# Patient Record
Sex: MALE | Race: WHITE | NOT HISPANIC OR LATINO | URBAN - METROPOLITAN AREA
[De-identification: names, ages, dates, MRNs, and addresses within clinical notes are randomized per-mention and may not be internally consistent; named-entity substitution may affect disease eponyms.]

---

## 2023-04-12 ENCOUNTER — OFFICE VISIT (OUTPATIENT)
Dept: URBAN - METROPOLITAN AREA CLINIC 113 | Facility: CLINIC | Age: 23
End: 2023-04-12
Payer: COMMERCIAL

## 2023-04-12 ENCOUNTER — LAB OUTSIDE AN ENCOUNTER (OUTPATIENT)
Dept: URBAN - METROPOLITAN AREA CLINIC 113 | Facility: CLINIC | Age: 23
End: 2023-04-12

## 2023-04-12 VITALS
BODY MASS INDEX: 24.37 KG/M2 | WEIGHT: 160.8 LBS | HEIGHT: 68 IN | SYSTOLIC BLOOD PRESSURE: 120 MMHG | RESPIRATION RATE: 18 BRPM | HEART RATE: 82 BPM | DIASTOLIC BLOOD PRESSURE: 63 MMHG | TEMPERATURE: 97.5 F

## 2023-04-12 DIAGNOSIS — R19.7 DIARRHEA, UNSPECIFIED TYPE: ICD-10-CM

## 2023-04-12 DIAGNOSIS — R93.5 ABNORMAL CT OF THE ABDOMEN: ICD-10-CM

## 2023-04-12 DIAGNOSIS — R10.30 LOWER ABDOMINAL PAIN: ICD-10-CM

## 2023-04-12 PROCEDURE — 99204 OFFICE O/P NEW MOD 45 MIN: CPT | Performed by: INTERNAL MEDICINE

## 2023-04-12 RX ORDER — VALACYCLOVIR 1 G/1
1 TABLET TABLET, FILM COATED ORAL ONCE A DAY
Status: ACTIVE | COMMUNITY

## 2023-04-17 ENCOUNTER — CLAIMS CREATED FROM THE CLAIM WINDOW (OUTPATIENT)
Dept: URBAN - METROPOLITAN AREA CLINIC 4 | Facility: CLINIC | Age: 23
End: 2023-04-17
Payer: COMMERCIAL

## 2023-04-17 ENCOUNTER — OFFICE VISIT (OUTPATIENT)
Dept: URBAN - METROPOLITAN AREA SURGERY CENTER 25 | Facility: SURGERY CENTER | Age: 23
End: 2023-04-17
Payer: COMMERCIAL

## 2023-04-17 ENCOUNTER — WEB ENCOUNTER (OUTPATIENT)
Dept: URBAN - METROPOLITAN AREA SURGERY CENTER 25 | Facility: SURGERY CENTER | Age: 23
End: 2023-04-17

## 2023-04-17 DIAGNOSIS — R93.3 ABN FINDINGS-GI TRACT: ICD-10-CM

## 2023-04-17 DIAGNOSIS — K52.3 COLITIS, INDETERMINATE: ICD-10-CM

## 2023-04-17 DIAGNOSIS — K52.89 OTHER SPECIFIED NONINFECTIVE GASTROENTERITIS AND COLITIS: ICD-10-CM

## 2023-04-17 PROCEDURE — G8907 PT DOC NO EVENTS ON DISCHARG: HCPCS | Performed by: INTERNAL MEDICINE

## 2023-04-17 PROCEDURE — 88305 TISSUE EXAM BY PATHOLOGIST: CPT | Performed by: PATHOLOGY

## 2023-04-17 PROCEDURE — 45380 COLONOSCOPY AND BIOPSY: CPT | Performed by: INTERNAL MEDICINE

## 2023-04-17 PROCEDURE — 88342 IMHCHEM/IMCYTCHM 1ST ANTB: CPT | Performed by: PATHOLOGY

## 2023-04-17 RX ORDER — VALACYCLOVIR 1 G/1
1 TABLET TABLET, FILM COATED ORAL ONCE A DAY
Status: ACTIVE | COMMUNITY

## 2023-04-17 RX ORDER — PREDNISONE 10 MG/1
4 TABLETS TABLET ORAL ONCE A DAY
Qty: 120 TABLET | Refills: 1 | OUTPATIENT
Start: 2023-04-26 | End: 2023-06-25

## 2023-04-26 PROBLEM — 56689002: Status: ACTIVE | Noted: 2023-04-26

## 2023-04-27 ENCOUNTER — TELEPHONE ENCOUNTER (OUTPATIENT)
Dept: URBAN - METROPOLITAN AREA CLINIC 113 | Facility: CLINIC | Age: 23
End: 2023-04-27

## 2023-05-03 ENCOUNTER — TELEPHONE ENCOUNTER (OUTPATIENT)
Dept: URBAN - METROPOLITAN AREA CLINIC 113 | Facility: CLINIC | Age: 23
End: 2023-05-03

## 2023-05-18 ENCOUNTER — TELEPHONE ENCOUNTER (OUTPATIENT)
Dept: URBAN - METROPOLITAN AREA CLINIC 113 | Facility: CLINIC | Age: 23
End: 2023-05-18

## 2023-07-18 ENCOUNTER — OFFICE VISIT (OUTPATIENT)
Dept: URBAN - METROPOLITAN AREA CLINIC 113 | Facility: CLINIC | Age: 23
End: 2023-07-18
Payer: COMMERCIAL

## 2023-07-18 ENCOUNTER — WEB ENCOUNTER (OUTPATIENT)
Dept: URBAN - METROPOLITAN AREA CLINIC 113 | Facility: CLINIC | Age: 23
End: 2023-07-18

## 2023-07-18 VITALS
WEIGHT: 159 LBS | TEMPERATURE: 97.8 F | HEIGHT: 68 IN | BODY MASS INDEX: 24.1 KG/M2 | HEART RATE: 77 BPM | DIASTOLIC BLOOD PRESSURE: 72 MMHG | SYSTOLIC BLOOD PRESSURE: 112 MMHG

## 2023-07-18 DIAGNOSIS — K50.00 CROHN'S DISEASE OF SMALL INTESTINE WITHOUT COMPLICATIONS: ICD-10-CM

## 2023-07-18 PROCEDURE — 99214 OFFICE O/P EST MOD 30 MIN: CPT | Performed by: INTERNAL MEDICINE

## 2023-07-18 RX ORDER — VALACYCLOVIR 1 G/1
1 TABLET TABLET, FILM COATED ORAL ONCE A DAY
Status: ACTIVE | COMMUNITY

## 2023-07-18 NOTE — HPI-TODAY'S VISIT:
Noe Roberts (Cole) is a 22-year-old male initially referred by Dr. Ahumada for evaluation of possible Crohn's disease.  He was initially seen as a new patient on 4/12/23.  The patient has had symptoms of crampy lower abdominal pain, loose stools and bloating for a year and a half or so.  Please demonstrated a complete inability to gain weight despite attempts to do so.  He typically averages 3-4 bowel movements per day.  On March 30, 2023, he had the acute onset of severe abdominal pain with nausea and vomiting and ultimately presented to the [facility] emergency room.  There he had a CBC, complete metabolic panel and other labs done as well as a CT scan of the abdomen and pelvis.  The patient's CBC showed a white blood cell count of 10,200, hemoglobin was 16.3, hematocrit was 50, platelet count was 340,000, AST was 35, ALT 26, total bilirubin 1.2, alkaline phosphatase 93, hepatitis C antibody was negative, C-reactive protein was 2.02, and sedimentation rate was 11.  The patient had a CT scan done on that time which showed inflammation of the small bowel and colon consistent with Crohn's disease.  He has done relatively well since that time, although he still has intermittent blood in the stool, bloating, and loose stools in general.  He has never had an upper endoscopy or colonoscopy.  After his initial evalustion on 4/12/23, he underwent a colonoscopy on 4/17/23 which showed active inflammatory  bowel disease in the terminal ileum and the cecum. Remicade was ordered but was denied by insurance.  The patient is doing well currently.  He has had no recurrence of symptoms overall, although he has noted that if he eats certain foods such as broccoli or cauliflower that he will develop nausea and lower abdominal discomfort.  He does feel that he is tender in the right lower quadrant area.  Interestingly, the patient's Remicade was ultimately approved after appeal.  He denies any diarrhea, fever, chills, or sweats.

## 2023-07-24 ENCOUNTER — TELEPHONE ENCOUNTER (OUTPATIENT)
Dept: URBAN - METROPOLITAN AREA CLINIC 113 | Facility: CLINIC | Age: 23
End: 2023-07-24

## 2023-08-02 ENCOUNTER — TELEPHONE ENCOUNTER (OUTPATIENT)
Dept: URBAN - METROPOLITAN AREA CLINIC 113 | Facility: CLINIC | Age: 23
End: 2023-08-02

## 2023-08-02 RX ORDER — PREDNISONE 10 MG/1
2 TABLET TABLET ORAL ONCE A DAY
Qty: 60 TABLET | Refills: 1 | OUTPATIENT
Start: 2023-08-02 | End: 2023-10-01

## 2023-08-03 ENCOUNTER — OFFICE VISIT (OUTPATIENT)
Dept: URBAN - METROPOLITAN AREA CLINIC 113 | Facility: CLINIC | Age: 23
End: 2023-08-03
Payer: COMMERCIAL

## 2023-08-03 VITALS
DIASTOLIC BLOOD PRESSURE: 65 MMHG | WEIGHT: 159 LBS | HEIGHT: 68 IN | SYSTOLIC BLOOD PRESSURE: 117 MMHG | HEART RATE: 68 BPM | TEMPERATURE: 97.7 F | RESPIRATION RATE: 16 BRPM | BODY MASS INDEX: 24.1 KG/M2

## 2023-08-03 DIAGNOSIS — R10.30 LOWER ABDOMINAL PAIN: ICD-10-CM

## 2023-08-03 DIAGNOSIS — K50.00 CROHN'S DISEASE OF SMALL INTESTINE WITHOUT COMPLICATIONS: ICD-10-CM

## 2023-08-03 PROCEDURE — 99214 OFFICE O/P EST MOD 30 MIN: CPT | Performed by: INTERNAL MEDICINE

## 2023-08-03 RX ORDER — PREDNISONE 10 MG/1
2 TABLET TABLET ORAL ONCE A DAY
Qty: 60 TABLET | Refills: 1 | Status: ACTIVE | COMMUNITY
Start: 2023-08-02 | End: 2023-10-01

## 2023-08-03 RX ORDER — VALACYCLOVIR 1 G/1
1 TABLET TABLET, FILM COATED ORAL ONCE A DAY
Status: ACTIVE | COMMUNITY

## 2023-08-03 NOTE — HPI-TODAY'S VISIT:
Noe Roberts (Cole) is a 22-year-old male initially referred by Dr. Ahumada for evaluation of possible Crohn's disease.  He was initially seen as a new patient on 4/12/23.  The patient has had symptoms of crampy lower abdominal pain, loose stools and bloating for a year and a half or so.  Please demonstrated a complete inability to gain weight despite attempts to do so.  He typically averages 3-4 bowel movements per day.  On March 30, 2023, he had the acute onset of severe abdominal pain with nausea and vomiting and ultimately presented to the [facility] emergency room.  There he had a CBC, complete metabolic panel and other labs done as well as a CT scan of the abdomen and pelvis.  The patient's CBC showed a white blood cell count of 10,200, hemoglobin was 16.3, hematocrit was 50, platelet count was 340,000, AST was 35, ALT 26, total bilirubin 1.2, alkaline phosphatase 93, hepatitis C antibody was negative, C-reactive protein was 2.02, and sedimentation rate was 11.  The patient had a CT scan done on that time which showed inflammation of the small bowel and colon consistent with Crohn's disease.  He has done relatively well since that time, although he still has intermittent blood in the stool, bloating, and loose stools in general.  He has never had an upper endoscopy or colonoscopy.  After his initial evalustion on 4/12/23, he underwent a colonoscopy on 4/17/23 which showed active inflammatory  bowel disease in the terminal ileum and the cecum. Remicade was ordered but was denied by insurance.  The patient was doing well at the time of his 7/18/23 follow up visit.   He had had no recurrence of symptoms overall, although he has noted that if he eats certain foods such as broccoli or cauliflower that he will develop nausea and lower abdominal discomfort.  He was tender in the right lower quadrant area.  Interestingly, the patient's Remicade was ultimately approved after appeal.  He denied any diarrhea, fever, chills, or sweats.  Patient had his initial induction dose of Remicade last week, and began having crampy lower abdominal pain 3 days later.  He also had nausea but no vomiting.  He denied any fever.  I obtained an abdominal series on him and it revealed a partial small bowel obstruction.  When I called him, he was not actively vomiting, so I called in prednisone 20 mg daily which she began taking last night.  He already feels better.  He has had decreased abdominal pain, no fever, and has not vomited.

## 2023-08-03 NOTE — EXAM-FUNCTIONAL ASSESSMENT
General--no acute distress Eyes--anicteric HENT--normocephalic, atraumatic head Neck--no lymphadenopathy Chest--normal breath sounds Heart--regular rate and rhythm Abdomen--soft, RLQ tender with some fullness in this area, non distended, bowel sounds present Musculoskeletal--normal gait and station Skin--no rashes Neurologic--Alert and oriented x 3 Psychiatric--stable mood, appropriate affect

## 2023-09-14 ENCOUNTER — OFFICE VISIT (OUTPATIENT)
Dept: URBAN - METROPOLITAN AREA CLINIC 113 | Facility: CLINIC | Age: 23
End: 2023-09-14
Payer: COMMERCIAL

## 2023-09-14 VITALS
HEART RATE: 68 BPM | TEMPERATURE: 97.3 F | DIASTOLIC BLOOD PRESSURE: 63 MMHG | WEIGHT: 160.4 LBS | HEIGHT: 68 IN | SYSTOLIC BLOOD PRESSURE: 128 MMHG | RESPIRATION RATE: 18 BRPM | BODY MASS INDEX: 24.31 KG/M2

## 2023-09-14 DIAGNOSIS — R10.30 LOWER ABDOMINAL PAIN: ICD-10-CM

## 2023-09-14 DIAGNOSIS — K50.00 CROHN'S DISEASE OF SMALL INTESTINE WITHOUT COMPLICATIONS: ICD-10-CM

## 2023-09-14 PROCEDURE — 99214 OFFICE O/P EST MOD 30 MIN: CPT | Performed by: INTERNAL MEDICINE

## 2023-09-14 RX ORDER — ESCITALOPRAM OXALATE 10 MG/1
1 TABLET TABLET, FILM COATED ORAL ONCE A DAY
Status: ACTIVE | COMMUNITY

## 2023-09-14 RX ORDER — VALACYCLOVIR 1 G/1
1 TABLET TABLET, FILM COATED ORAL ONCE A DAY
Status: ACTIVE | COMMUNITY

## 2023-09-14 RX ORDER — PREDNISONE 10 MG/1
2 TABLET TABLET ORAL ONCE A DAY
Qty: 60 TABLET | Refills: 1 | Status: ON HOLD | COMMUNITY
Start: 2023-08-02 | End: 2023-10-01

## 2023-09-14 NOTE — HPI-TODAY'S VISIT:
Noe Roberts (Cole) is a 23-year-old male initially referred by Dr. Ahumada for evaluation of possible Crohn's disease.  He was initially seen as a new patient on 4/12/23.  The patient has had symptoms of crampy lower abdominal pain, loose stools and bloating for a year and a half or so.  Please demonstrated a complete inability to gain weight despite attempts to do so.  He typically averages 3-4 bowel movements per day.  On March 30, 2023, he had the acute onset of severe abdominal pain with nausea and vomiting and ultimately presented to the [facility] emergency room.  There he had a CBC, complete metabolic panel and other labs done as well as a CT scan of the abdomen and pelvis.  The patient's CBC showed a white blood cell count of 10,200, hemoglobin was 16.3, hematocrit was 50, platelet count was 340,000, AST was 35, ALT 26, total bilirubin 1.2, alkaline phosphatase 93, hepatitis C antibody was negative, C-reactive protein was 2.02, and sedimentation rate was 11.  The patient had a CT scan done on that time which showed inflammation of the small bowel and colon consistent with Crohn's disease.  He has done relatively well since that time, although he still has intermittent blood in the stool, bloating, and loose stools in general.  He has never had an upper endoscopy or colonoscopy.  After his initial evalustion on 4/12/23, he underwent a colonoscopy on 4/17/23 which showed active inflammatory  bowel disease in the terminal ileum and the cecum. Remicade was ordered but was denied by insurance.  The patient was doing well at the time of his 7/18/23 follow up visit.   He had had no recurrence of symptoms overall, although he has noted that if he eats certain foods such as broccoli or cauliflower that he will develop nausea and lower abdominal discomfort.  He was tender in the right lower quadrant area.  Interestingly, the patient's Remicade was ultimately approved after appeal.  He denied any diarrhea, fever, chills, or sweats.  Patient had his initial induction dose of Remicade the week prior to his 8/3/23 office visit, and began having crampy lower abdominal pain 3 days later.  He also had nausea but no vomiting.  He denied any fever.  I obtained an abdominal series on him and it revealed a partial small bowel obstruction.  When I called him, he was not actively vomiting, so I called in prednisone 20 mg daily which he began taking the night prior to his 8/3/23 OV.  He responded almost immediately, withdecreased abdominal pain, no fever, and no further vomiting.  The patient returns for follow-up.  He is off prednisone.  He has very active bowel sounds after eating, and otherwise does not have any complaints.  He still has some mild right lower quadrant tenderness.  He had a rash on his back after his last Remicade infusion which resolved after about 4 days.  Otherwise, he has no complaints today.

## 2023-12-20 ENCOUNTER — OFFICE VISIT (OUTPATIENT)
Dept: URBAN - METROPOLITAN AREA CLINIC 113 | Facility: CLINIC | Age: 23
End: 2023-12-20

## 2023-12-20 RX ORDER — ESCITALOPRAM OXALATE 10 MG/1
1 TABLET TABLET, FILM COATED ORAL ONCE A DAY
Status: ACTIVE | COMMUNITY

## 2023-12-20 RX ORDER — VALACYCLOVIR 1 G/1
1 TABLET TABLET, FILM COATED ORAL ONCE A DAY
Status: ACTIVE | COMMUNITY

## 2023-12-20 NOTE — HPI-TODAY'S VISIT:
Noe Roberts (Cole) is a 23-year-old male initially referred by Dr. Ahumada for evaluation of possible Crohn's disease.  He was initially seen as a new patient on 4/12/23; his last office visit was on 9/14/23.  The patient described symptoms of crampy lower abdominal pain, loose stools and bloating dating back to early 2022 and noted a complete inability to gain weight despite attempts to do so.  He typically averages 3-4 bowel movements per day.    On March 30, 2023, he had the acute onset of severe abdominal pain with nausea and vomiting and ultimately presented to the [facility] emergency room.  There he had a CBC, complete metabolic panel and other labs done as well as a CT scan of the abdomen and pelvis.  The patient's CBC showed a white blood cell count of 10,200, [id number], hematocrit 50, platelet count 340,000, AST 35, ALT 26, total bilirubin 1.2, alkaline phosphatase 93, hepatitis C antibody was negative, C-reactive protein  2.02, and sedimentation rate 11.  The patient had a CT scan done on that time which showed inflammation of the small bowel and colon consistent with Crohn's disease.  He has done relatively well since that time, although he still has intermittent blood in the stool, bloating, and loose stools in general.  He has never had an upper endoscopy or colonoscopy.  After his initial evalustion on 4/12/23, he underwent a colonoscopy on 4/17/23 which showed active inflammatory  bowel disease in the terminal ileum and the cecum. Remicade was ordered but was denied by insurance.  The patient was doing well at the time of his 7/18/23 follow up visit.   He had had no recurrence of symptoms overall, although he had noted that if he ate certain foods such as broccoli or cauliflower that he will develop nausea and lower abdominal discomfort.  He was tender in the right lower quadrant area.  Interestingly, the patient's Remicade was ultimately approved after appeal.  He denied any diarrhea, fever, chills, or sweats.  Patient had his initial induction dose of Remicade the week prior to his 8/3/23 office visit, and began having crampy lower abdominal pain 3 days later.  He also had nausea but no vomiting.  He denied any fever.  I obtained an abdominal series on him and it revealed a partial small bowel obstruction.  When I called him, he was not actively vomiting, so I called in prednisone 20 mg daily which he began taking the night prior to his 8/3/23 OV.  He responded almost immediately, withdecreased abdominal pain, no fever, and no further vomiting.  The patient returned for follow-up on 9/14/23.  He was off prednisone.  He had very active bowel sounds after eating, but otherwise did not have any complaints.  He still had some mild right lower quadrant tenderness.  He had a rash on his back after his last Remicade infusion which resolved after about 4 days.

## 2024-02-28 ENCOUNTER — OV EP (OUTPATIENT)
Dept: URBAN - METROPOLITAN AREA CLINIC 113 | Facility: CLINIC | Age: 24
End: 2024-02-28

## 2024-02-28 ENCOUNTER — LAB (OUTPATIENT)
Dept: URBAN - METROPOLITAN AREA CLINIC 113 | Facility: CLINIC | Age: 24
End: 2024-02-28

## 2024-02-28 VITALS
HEART RATE: 75 BPM | BODY MASS INDEX: 26.31 KG/M2 | HEIGHT: 68 IN | DIASTOLIC BLOOD PRESSURE: 62 MMHG | WEIGHT: 173.6 LBS | RESPIRATION RATE: 20 BRPM | TEMPERATURE: 97.5 F | SYSTOLIC BLOOD PRESSURE: 106 MMHG

## 2024-02-28 RX ORDER — ESCITALOPRAM OXALATE 10 MG/1
1 TABLET TABLET, FILM COATED ORAL ONCE A DAY
Status: ON HOLD | COMMUNITY

## 2024-02-28 RX ORDER — VALACYCLOVIR 1 G/1
1 TABLET TABLET, FILM COATED ORAL ONCE A DAY
Status: ACTIVE | COMMUNITY

## 2024-02-28 NOTE — HPI-TODAY'S VISIT:
Noe Roberts (Cole) is a 23-year-old male initially referred by Dr. Ahumada for evaluation of possible Crohn's disease.  He was initially seen as a new patient on 4/12/23; his last office visit was on 9/14/23.  The patient described symptoms of crampy lower abdominal pain, loose stools and bloating dating back to early 2022 and noted a complete inability to gain weight despite attempts to do so.  He typically averages 3-4 bowel movements per day.    On March 30, 2023, he had the acute onset of severe abdominal pain with nausea and vomiting and ultimately presented to the [facility] emergency room.  There he had a CBC, complete metabolic panel and other labs done as well as a CT scan of the abdomen and pelvis.  The patient's CBC showed a white blood cell count of 10,200, [id number], hematocrit 50, platelet count 340,000, AST 35, ALT 26, total bilirubin 1.2, alkaline phosphatase 93, hepatitis C antibody was negative, C-reactive protein  2.02, and sedimentation rate 11.  The patient had a CT scan done on that time which showed inflammation of the small bowel and colon consistent with Crohn's disease.  He has done relatively well since that time, although he still has intermittent blood in the stool, bloating, and loose stools in general.  He has never had an upper endoscopy or colonoscopy.  After his initial evalustion on 4/12/23, he underwent a colonoscopy on 4/17/23 which showed active inflammatory  bowel disease in the terminal ileum and the cecum. Remicade was ordered but was denied by insurance.  The patient was doing well at the time of his 7/18/23 follow up visit.   He had had no recurrence of symptoms overall, although he had noted that if he ate certain foods such as broccoli or cauliflower that he will develop nausea and lower abdominal discomfort.  He was tender in the right lower quadrant area.  Interestingly, the patient's Remicade was ultimately approved after appeal.  He denied any diarrhea, fever, chills, or sweats.  Patient had his initial induction dose of Remicade the week prior to his 8/3/23 office visit, and began having crampy lower abdominal pain 3 days later.  He also had nausea but no vomiting.  He denied any fever.  I obtained an abdominal series on him and it revealed a partial small bowel obstruction.  When I called him, he was not actively vomiting, so I called in prednisone 20 mg daily which he began taking the night prior to his 8/3/23 OV.  He responded almost immediately, withdecreased abdominal pain, no fever, and no further vomiting.  The patient returned for follow-up on 9/14/23.  He was off prednisone.  He had very active bowel sounds after eating, but otherwise did not have any complaints.  He still had some mild right lower quadrant tenderness.  He had a rash on his back after his last Remicade infusion which resolved after about 4 days. His last Remicade infusion was 1/18/2024.  He has an infusion scheduled this week.  He reports 1 formed bowel movement per day.  He denies red blood per rectum or melena.  He denies abdominal pain or nausea.  He has mild heartburn 2 or 3 times a week relieved with drinking water.  He denies dysphagia or other abdominal symptoms. Since his last visit, he has undergone comprehensive testing in [place] with occupational/environmental medicine.  It has been determined that there are multiple food items to which she is intolerant or allergic.  He has been informed that some of these food items may exacerbate inflammation.  He has modified his diet avoiding grains, gluten, yeast, sugar, and some vegetables such as broccoli and zucchini.  He reports these items were graded on a scale of 0-3 rating their likelihood of causing inflammation.  He has stopped all vitamins that are not 0.  He has lost 13 pounds since his last visit. Labs 10/11/2023:CBC: WBC 5.8, hemoglobin 15, MCV 84, platelet 233.  BMP normal.  LFTs normal TB 0.7, ALP 71, ALT 13, AST 27.  Urinalysis normal.  Cholesterol panel normal.  TSH 1.520.  Hemoglobin A1c 5.2.  ESR 2.  HIV nonreactive.

## 2024-03-02 PROBLEM — 453861000124107: Status: ACTIVE | Noted: 2024-03-02

## 2024-06-20 ENCOUNTER — TELEPHONE ENCOUNTER (OUTPATIENT)
Dept: URBAN - METROPOLITAN AREA CLINIC 113 | Facility: CLINIC | Age: 24
End: 2024-06-20

## 2024-06-20 RX ORDER — INFLIXIMAB 100 MG/10ML
AS DIRECTED INJECTION, POWDER, LYOPHILIZED, FOR SOLUTION INTRAVENOUS
Qty: 100 | Refills: 5 | OUTPATIENT
Start: 2024-06-20 | End: 2025-05-22

## 2024-07-05 ENCOUNTER — TELEPHONE ENCOUNTER (OUTPATIENT)
Dept: URBAN - METROPOLITAN AREA CLINIC 112 | Facility: CLINIC | Age: 24
End: 2024-07-05

## 2024-07-09 ENCOUNTER — TELEPHONE ENCOUNTER (OUTPATIENT)
Dept: URBAN - METROPOLITAN AREA CLINIC 6 | Facility: CLINIC | Age: 24
End: 2024-07-09

## 2024-07-22 ENCOUNTER — TELEPHONE ENCOUNTER (OUTPATIENT)
Dept: URBAN - METROPOLITAN AREA CLINIC 113 | Facility: CLINIC | Age: 24
End: 2024-07-22

## 2024-07-25 LAB
MITOGEN-NIL: 7.12
QUANTIFERON NIL VALUE: 0.02
QUANTIFERON TB1 AG VALUE: 0.01
QUANTIFERON TB2 AG VALUE: 0.01
QUANTIFERON-TB GOLD PLUS: NEGATIVE

## 2024-09-19 ENCOUNTER — TELEPHONE ENCOUNTER (OUTPATIENT)
Dept: URBAN - METROPOLITAN AREA CLINIC 113 | Facility: CLINIC | Age: 24
End: 2024-09-19

## 2024-09-19 ENCOUNTER — TELEPHONE ENCOUNTER (OUTPATIENT)
Dept: URBAN - METROPOLITAN AREA CLINIC 23 | Facility: CLINIC | Age: 24
End: 2024-09-19

## 2025-08-06 ENCOUNTER — TELEPHONE ENCOUNTER (OUTPATIENT)
Dept: URBAN - METROPOLITAN AREA CLINIC 113 | Facility: CLINIC | Age: 25
End: 2025-08-06

## 2025-08-15 ENCOUNTER — OFFICE VISIT (OUTPATIENT)
Dept: URBAN - METROPOLITAN AREA CLINIC 113 | Facility: CLINIC | Age: 25
End: 2025-08-15
Payer: COMMERCIAL

## 2025-08-15 DIAGNOSIS — K50.018 CROHN'S DISEASE OF SMALL INTESTINE WITH OTHER COMPLICATION: ICD-10-CM

## 2025-08-15 PROCEDURE — 99214 OFFICE O/P EST MOD 30 MIN: CPT | Performed by: NURSE PRACTITIONER

## 2025-08-15 RX ORDER — ESCITALOPRAM OXALATE 10 MG/1
1 TABLET TABLET, FILM COATED ORAL ONCE A DAY
Status: ON HOLD | COMMUNITY

## 2025-08-15 RX ORDER — PREDNISONE 10 MG/1
4 TABLETS TABLET ORAL ONCE A DAY
Qty: 120 TABLET | Refills: 2 | OUTPATIENT
Start: 2025-08-15

## 2025-08-15 RX ORDER — VALACYCLOVIR 1 G/1
1 TABLET TABLET, FILM COATED ORAL ONCE A DAY
Status: ACTIVE | COMMUNITY

## 2025-08-16 ENCOUNTER — DASHBOARD ENCOUNTERS (OUTPATIENT)
Age: 25
End: 2025-08-16